# Patient Record
Sex: MALE | Race: WHITE | NOT HISPANIC OR LATINO | Employment: OTHER | URBAN - NONMETROPOLITAN AREA
[De-identification: names, ages, dates, MRNs, and addresses within clinical notes are randomized per-mention and may not be internally consistent; named-entity substitution may affect disease eponyms.]

---

## 2023-11-03 ENCOUNTER — DOCUMENTATION (OUTPATIENT)
Dept: FAMILY MEDICINE CLINIC | Facility: CLINIC | Age: 63
End: 2023-11-03
Payer: MEDICARE

## 2023-11-03 PROBLEM — J96.01 ACUTE HYPOXIC RESPIRATORY FAILURE: Status: ACTIVE | Noted: 2023-11-03

## 2023-11-03 PROBLEM — F10.20 ALCOHOL DEPENDENCE: Status: ACTIVE | Noted: 2023-11-03
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
## 2023-11-03 PROBLEM — S22.32XD FRACTURE OF ONE RIB, LEFT SIDE, SUBSEQUENT ENCOUNTER FOR FRACTURE WITH ROUTINE HEALING: Status: ACTIVE | Noted: 2023-11-03

## 2023-11-03 PROBLEM — M62.81 MUSCLE WEAKNESS (GENERALIZED): Status: ACTIVE | Noted: 2023-11-03

## 2023-11-03 PROBLEM — F10.931 ALCOHOL WITHDRAWAL DELIRIUM, ACUTE, HYPERACTIVE: Status: ACTIVE | Noted: 2023-11-03

## 2023-11-03 RX ORDER — OXYCODONE HYDROCHLORIDE 5 MG/1
5 TABLET ORAL EVERY 6 HOURS PRN
COMMUNITY
End: 2023-11-07 | Stop reason: SDUPTHER

## 2023-11-03 RX ORDER — POLYETHYLENE GLYCOL 3350 17 G/17G
17 POWDER, FOR SOLUTION ORAL DAILY
COMMUNITY

## 2023-11-03 RX ORDER — ACETAMINOPHEN 325 MG/1
650 TABLET ORAL EVERY 8 HOURS PRN
COMMUNITY

## 2023-11-03 RX ORDER — INSULIN GLARGINE 100 [IU]/ML
25 INJECTION, SOLUTION SUBCUTANEOUS DAILY
COMMUNITY

## 2023-11-03 RX ORDER — ASCORBIC ACID 500 MG
500 TABLET ORAL DAILY
COMMUNITY

## 2023-11-03 RX ORDER — ORAL SEMAGLUTIDE 3 MG/1
3 TABLET ORAL DAILY
COMMUNITY

## 2023-11-03 RX ORDER — TAMSULOSIN HYDROCHLORIDE 0.4 MG/1
1 CAPSULE ORAL DAILY
COMMUNITY

## 2023-11-03 RX ORDER — CHOLECALCIFEROL (VITAMIN D3) 125 MCG
5 CAPSULE ORAL NIGHTLY PRN
COMMUNITY

## 2023-11-06 PROBLEM — M54.30 SCIATICA: Status: ACTIVE | Noted: 2023-11-06

## 2023-11-06 PROBLEM — R06.02 SOB (SHORTNESS OF BREATH): Status: ACTIVE | Noted: 2023-11-06

## 2023-11-06 RX ORDER — NICOTINE 21 MG/24HR
1 PATCH, TRANSDERMAL 24 HOURS TRANSDERMAL EVERY 24 HOURS
COMMUNITY

## 2023-11-06 RX ORDER — HYDROCHLOROTHIAZIDE 12.5 MG/1
12.5 TABLET ORAL DAILY
COMMUNITY

## 2023-11-06 RX ORDER — LISINOPRIL 40 MG/1
40 TABLET ORAL DAILY
COMMUNITY

## 2023-11-06 RX ORDER — HYDROXYZINE HYDROCHLORIDE 25 MG/1
12.5 TABLET, FILM COATED ORAL NIGHTLY PRN
COMMUNITY

## 2023-11-06 RX ORDER — MULTIPLE VITAMINS W/ MINERALS TAB 9MG-400MCG
1 TAB ORAL DAILY
COMMUNITY

## 2023-11-06 RX ORDER — PANTOPRAZOLE SODIUM 40 MG/1
40 TABLET, DELAYED RELEASE ORAL DAILY
COMMUNITY

## 2023-11-06 RX ORDER — METHOCARBAMOL 500 MG/1
500 TABLET, FILM COATED ORAL 3 TIMES DAILY PRN
COMMUNITY

## 2023-11-06 RX ORDER — BACLOFEN 10 MG/1
10 TABLET ORAL 3 TIMES DAILY PRN
COMMUNITY

## 2023-11-06 RX ORDER — AMLODIPINE BESYLATE 5 MG/1
5 TABLET ORAL DAILY
COMMUNITY

## 2023-11-06 RX ORDER — FINASTERIDE 5 MG/1
5 TABLET, FILM COATED ORAL DAILY
COMMUNITY

## 2023-11-07 RX ORDER — OXYCODONE HYDROCHLORIDE 5 MG/1
5 TABLET ORAL EVERY 6 HOURS PRN
Qty: 120 TABLET | Refills: 0 | Status: SHIPPED | OUTPATIENT
Start: 2023-11-07

## 2023-11-07 NOTE — TELEPHONE ENCOUNTER
[facility] REQUESTING MED REFILL FOR OXYCODONE 5 MG.    DIRECTIONS: OXYCODONE 5 MG 1 TAB PO  Q 6 HRS PRN.

## 2023-11-08 NOTE — PROGRESS NOTES
Nursing Home History and Physical       Edison Torres DO  [address] [place] [address] Phone: [phone]  Fax: [phone]     PATIENT NAME: Minh Wright                                                                          YOB: 1960           DATE OF SERVICE: 11/09/2023  FACILITY:  [facility]    CHIEF COMPLAINT:  Nursing facility admission      HISTORY OF PRESENT ILLNESS:   Patient is a 63-year-old male with a history of osteogenesis imperfecta, type 2 diabetes mellitus, obstructive sleep apnea, and alcohol use disorder recently hospitalized at  for fall resulting in left rib fracture.  Patient was also treated for alcohol withdrawal but was relatively mild.  Due to debility and weakness, patient was transferred to this facility for strengthening and closer monitoring.    On exam today patient was sitting up comfortably working on his laptop at the side of his bed with the TV on.  He shares that he has been doing quite well since his admission to this facility and feels that he can do more than what physical therapy is expecting him to do.  He is looking forward to discharging to home on Saturday.  Rib pain is under reasonable control.  He is very aware of his history of osteogenesis imperfecta and multiple fractures.  Remains well off of alcohol.  Glucose has been better controlled in the facility.      PAST MEDICAL & SURGICAL HISTORY:   Past Medical History:   Diagnosis Date    Alcohol use disorder     Allergic     Allergic rhinitis     Anxiety     Arthritis     Arthropathy     At high risk for falls     Benign prostatic hyperplasia with lower urinary tract symptoms     BMI 40.0-44.9, adult     Cardiomegaly     Cataract     Cellulitis of unspecified part of limb     CPAP (continuous positive airway pressure) dependence     Elevation of levels of liver transaminase levels     GERD (gastroesophageal reflux disease)     GERD without esophagitis     Hyperlipidemia      "Hypertension     Low back pain     Lymphedema, both  lower extremities     Multiple lung nodules     Obesity     Obstructive sleep apnea (adult) (pediatric)     Osteogenesis imperfecta     Other fatigue     Polyneuropathy     Posterior vitreous detachment, right eye     Retention of urine     Right knee pain     Shoulder pain     Transaminitis     Type 2 diabetes mellitus with diabetic neuropathy, unspecified     Type 2 diabetes mellitus without complications     Vitreous floaters       Past Surgical History:   Procedure Laterality Date    APPENDECTOMY      CATARACT EXTRACTION W/ INTRAOCULAR LENS IMPLANT      FRACTURE SURGERY      LEG SURGERY           MEDICATIONS:  I have reviewed and reconciled the patients medication list in the patients chart at the [facility] on 11/09/2023.      ALLERGIES:  Allergies   Allergen Reactions    Duloxetine Unknown - High Severity    Hydrocodone Bitartrate [Hydrocodone] Unknown - High Severity    Morphine Unknown - High Severity    Oxycodone Unknown - High Severity     ITCHING         SOCIAL HISTORY:  Social History     Socioeconomic History    Marital status: Single   Tobacco Use    Smoking status: Every Day     Types: Cigarettes   Substance and Sexual Activity    Alcohol use: Yes     Alcohol/week: 2.0 - 3.0 standard drinks of alcohol     Types: 2 - 3 Cans of beer per week     Comment: \"A BOTTLE OF DEBI DAILY,ALONG WITH SEVERAL BEERS\"    Drug use: Never    Sexual activity: Defer       FAMILY HISTORY:  Family History   Problem Relation Age of Onset    Cancer Mother     Asthma Mother     Colon cancer Mother     Heart disease Father     Osteogenesis imperfecta Father     Bipolar disorder Brother     Heart attack Paternal Uncle     Suicidality Maternal Grandfather         REVIEW OF SYSTEMS:  Review of Systems   Constitutional:  Negative for chills, fatigue and fever.   HENT:  Negative for congestion, ear pain, rhinorrhea, sinus pressure and sore throat.    Eyes:  " Negative for visual disturbance.   Respiratory:  Negative for cough, chest tightness, shortness of breath and wheezing.    Cardiovascular:  Negative for chest pain, palpitations and leg swelling.   Gastrointestinal:  Negative for abdominal pain, blood in stool, constipation, diarrhea, nausea and vomiting.   Endocrine: Negative for polydipsia and polyuria.   Genitourinary:  Negative for dysuria and hematuria.   Musculoskeletal:  Negative for arthralgias and back pain.   Skin:  Negative for rash.   Neurological:  Negative for dizziness, light-headedness, numbness and headaches.   Psychiatric/Behavioral:  Negative for dysphoric mood and sleep disturbance. The patient is not nervous/anxious.          PHYSICAL EXAMINATION:   VITAL SIGNS: /78   Pulse 72   Temp 97.9 °F (36.6 °C)   Resp 18   SpO2 97%     Physical Exam  Vitals and nursing note reviewed.   Constitutional:       Appearance: Normal appearance. He is well-developed. He is obese.   HENT:      Head: Normocephalic and atraumatic.      Nose: Nose normal.      Mouth/Throat:      Mouth: Mucous membranes are moist.      Pharynx: No oropharyngeal exudate.   Eyes:      General: No scleral icterus.     Conjunctiva/sclera: Conjunctivae normal.      Pupils: Pupils are equal, round, and reactive to light.      Comments: Blue conjunctive a   Neck:      Thyroid: No thyromegaly.   Cardiovascular:      Rate and Rhythm: Normal rate and regular rhythm.      Heart sounds: Normal heart sounds. No murmur heard.     No friction rub. No gallop.   Pulmonary:      Effort: Pulmonary effort is normal. No respiratory distress.      Breath sounds: Normal breath sounds. No wheezing.   Abdominal:      General: Bowel sounds are normal. There is no distension.      Palpations: Abdomen is soft.      Tenderness: There is no abdominal tenderness.   Musculoskeletal:         General: No deformity or signs of injury.      Cervical back: Normal range of motion and neck supple.    Lymphadenopathy:      Cervical: No cervical adenopathy.   Skin:     General: Skin is warm and dry.      Findings: No rash.   Neurological:      Mental Status: He is alert and oriented to person, place, and time.   Psychiatric:         Mood and Affect: Mood normal.         Behavior: Behavior normal.         RECORDS REVIEW:   Discharge Summary from [facility] 11/3/2023    ASSESSMENT   Diagnoses and all orders for this visit:    1. Fracture of one rib, left side, subsequent encounter for fracture with routine healing (Primary)    2. Alcohol dependence in remission    3. Muscle weakness (generalized)    4. Sciatica of right side    5. Benign prostatic hyperplasia with urinary frequency    6. Mixed hyperlipidemia    7. Type 2 diabetes mellitus without complication, without long-term current use of insulin    8. Morbid (severe) obesity due to excess calories        PLAN  Acute seventh rib fracture  - Continue pain control with acetaminophen, methocarbamol, and oxycodone.    Osteogenesis imperfecta  - Patient is at high risk for fractures with falls.  - Continue close monitoring with fall prevention protocols.    Alcoholism  - Off alcohol since admission to facility    Alcoholic liver disease/transaminitis  - Work-up at  presented hepatic steatosis and LFTs were trending downwards.  - Monitor liver function in facility intermittently.    Essential hypertension  - Stable on home regimen of amlodipine, HCTZ, and lisinopril.  - It is noted that at home his compliance with medications was questionable.    BPH/overactive bladder  - Continue finasteride and tamsulosin.    Hyperlipidemia  - Atorvastatin was restarted at the time of discharge from hospital.    Type 2 diabetes mellitus  - In hospital: Lantus 25 units nightly with 3 units with meals plus SSI  -In nursing facility we will continue home regimen of metformin and semaglutide  - Last A1c reported at 8.5.  ROSALIE  - Continue CPAP.    Morbid obesity  - BMI of 45.  This  does complicate aspects of care and recovery time.            [x]  Discussed Patient in detail with nursing/staff, addressed all needs today.     [x]  Plan of Care Reviewed   [x]  PT/OT Reviewed   []  Order Changes  []  Discharge Plans Reviewed  [x]  Advance Directive on file with Nursing Home.   [x]  POA on file with Nursing Home.    [x]  Code Status listed and reviewed.         Edison Torres DO.  11/9/2023      **Part of this note may be an electronic transcription/translation of spoken language to printed text using the Dragon Dictation System.**

## 2023-11-09 ENCOUNTER — NURSING HOME (OUTPATIENT)
Dept: INTERNAL MEDICINE | Facility: CLINIC | Age: 63
End: 2023-11-09
Payer: MEDICARE

## 2023-11-09 VITALS
RESPIRATION RATE: 18 BRPM | HEART RATE: 72 BPM | TEMPERATURE: 97.9 F | OXYGEN SATURATION: 97 % | SYSTOLIC BLOOD PRESSURE: 142 MMHG | DIASTOLIC BLOOD PRESSURE: 78 MMHG

## 2023-11-09 DIAGNOSIS — R35.0 BENIGN PROSTATIC HYPERPLASIA WITH URINARY FREQUENCY: ICD-10-CM

## 2023-11-09 DIAGNOSIS — E78.2 MIXED HYPERLIPIDEMIA: ICD-10-CM

## 2023-11-09 DIAGNOSIS — S22.32XD FRACTURE OF ONE RIB, LEFT SIDE, SUBSEQUENT ENCOUNTER FOR FRACTURE WITH ROUTINE HEALING: Primary | ICD-10-CM

## 2023-11-09 DIAGNOSIS — M54.31 SCIATICA OF RIGHT SIDE: ICD-10-CM

## 2023-11-09 DIAGNOSIS — F10.21 ALCOHOL DEPENDENCE IN REMISSION: ICD-10-CM

## 2023-11-09 DIAGNOSIS — M62.81 MUSCLE WEAKNESS (GENERALIZED): ICD-10-CM

## 2023-11-09 DIAGNOSIS — E66.01 MORBID (SEVERE) OBESITY DUE TO EXCESS CALORIES: ICD-10-CM

## 2023-11-09 DIAGNOSIS — E11.9 TYPE 2 DIABETES MELLITUS WITHOUT COMPLICATION, WITHOUT LONG-TERM CURRENT USE OF INSULIN: ICD-10-CM

## 2023-11-09 DIAGNOSIS — N40.1 BENIGN PROSTATIC HYPERPLASIA WITH URINARY FREQUENCY: ICD-10-CM

## 2023-11-09 NOTE — LETTER
Nursing Home History and Physical       Edison Torres DO  [address] [place] [address] Phone: [phone]  Fax: [phone]     PATIENT NAME: Minh Wright                                                                          YOB: 1960           DATE OF SERVICE: 11/09/2023  FACILITY:  [facility]    CHIEF COMPLAINT:  Nursing facility admission      HISTORY OF PRESENT ILLNESS:   Patient is a 63-year-old male with a history of osteogenesis imperfecta, type 2 diabetes mellitus, obstructive sleep apnea, and alcohol use disorder recently hospitalized at  for fall resulting in left rib fracture.  Patient was also treated for alcohol withdrawal but was relatively mild.  Due to debility and weakness, patient was transferred to this facility for strengthening and closer monitoring.    On exam today patient was sitting up comfortably working on his laptop at the side of his bed with the TV on.  He shares that he has been doing quite well since his admission to this facility and feels that he can do more than what physical therapy is expecting him to do.  He is looking forward to discharging to home on Saturday.  Rib pain is under reasonable control.  He is very aware of his history of osteogenesis imperfecta and multiple fractures.  Remains well off of alcohol.  Glucose has been better controlled in the facility.      PAST MEDICAL & SURGICAL HISTORY:   Past Medical History:   Diagnosis Date    Alcohol use disorder     Allergic     Allergic rhinitis     Anxiety     Arthritis     Arthropathy     At high risk for falls     Benign prostatic hyperplasia with lower urinary tract symptoms     BMI 40.0-44.9, adult     Cardiomegaly     Cataract     Cellulitis of unspecified part of limb     CPAP (continuous positive airway pressure) dependence     Elevation of levels of liver transaminase levels     GERD (gastroesophageal reflux disease)     GERD without esophagitis     Hyperlipidemia      "Hypertension     Low back pain     Lymphedema, both  lower extremities     Multiple lung nodules     Obesity     Obstructive sleep apnea (adult) (pediatric)     Osteogenesis imperfecta     Other fatigue     Polyneuropathy     Posterior vitreous detachment, right eye     Retention of urine     Right knee pain     Shoulder pain     Transaminitis     Type 2 diabetes mellitus with diabetic neuropathy, unspecified     Type 2 diabetes mellitus without complications     Vitreous floaters       Past Surgical History:   Procedure Laterality Date    APPENDECTOMY      CATARACT EXTRACTION W/ INTRAOCULAR LENS IMPLANT      FRACTURE SURGERY      LEG SURGERY           MEDICATIONS:  I have reviewed and reconciled the patients medication list in the patients chart at the [facility] on 11/09/2023.      ALLERGIES:  Allergies   Allergen Reactions    Duloxetine Unknown - High Severity    Hydrocodone Bitartrate [Hydrocodone] Unknown - High Severity    Morphine Unknown - High Severity    Oxycodone Unknown - High Severity     ITCHING         SOCIAL HISTORY:  Social History     Socioeconomic History    Marital status: Single   Tobacco Use    Smoking status: Every Day     Types: Cigarettes   Substance and Sexual Activity    Alcohol use: Yes     Alcohol/week: 2.0 - 3.0 standard drinks of alcohol     Types: 2 - 3 Cans of beer per week     Comment: \"A BOTTLE OF DEBI DAILY,ALONG WITH SEVERAL BEERS\"    Drug use: Never    Sexual activity: Defer       FAMILY HISTORY:  Family History   Problem Relation Age of Onset    Cancer Mother     Asthma Mother     Colon cancer Mother     Heart disease Father     Osteogenesis imperfecta Father     Bipolar disorder Brother     Heart attack Paternal Uncle     Suicidality Maternal Grandfather         REVIEW OF SYSTEMS:  Review of Systems   Constitutional:  Negative for chills, fatigue and fever.   HENT:  Negative for congestion, ear pain, rhinorrhea, sinus pressure and sore throat.    Eyes:  " Negative for visual disturbance.   Respiratory:  Negative for cough, chest tightness, shortness of breath and wheezing.    Cardiovascular:  Negative for chest pain, palpitations and leg swelling.   Gastrointestinal:  Negative for abdominal pain, blood in stool, constipation, diarrhea, nausea and vomiting.   Endocrine: Negative for polydipsia and polyuria.   Genitourinary:  Negative for dysuria and hematuria.   Musculoskeletal:  Negative for arthralgias and back pain.   Skin:  Negative for rash.   Neurological:  Negative for dizziness, light-headedness, numbness and headaches.   Psychiatric/Behavioral:  Negative for dysphoric mood and sleep disturbance. The patient is not nervous/anxious.          PHYSICAL EXAMINATION:   VITAL SIGNS: /78   Pulse 72   Temp 97.9 °F (36.6 °C)   Resp 18   SpO2 97%     Physical Exam  Vitals and nursing note reviewed.   Constitutional:       Appearance: Normal appearance. He is well-developed. He is obese.   HENT:      Head: Normocephalic and atraumatic.      Nose: Nose normal.      Mouth/Throat:      Mouth: Mucous membranes are moist.      Pharynx: No oropharyngeal exudate.   Eyes:      General: No scleral icterus.     Conjunctiva/sclera: Conjunctivae normal.      Pupils: Pupils are equal, round, and reactive to light.      Comments: Blue conjunctive a   Neck:      Thyroid: No thyromegaly.   Cardiovascular:      Rate and Rhythm: Normal rate and regular rhythm.      Heart sounds: Normal heart sounds. No murmur heard.     No friction rub. No gallop.   Pulmonary:      Effort: Pulmonary effort is normal. No respiratory distress.      Breath sounds: Normal breath sounds. No wheezing.   Abdominal:      General: Bowel sounds are normal. There is no distension.      Palpations: Abdomen is soft.      Tenderness: There is no abdominal tenderness.   Musculoskeletal:         General: No deformity or signs of injury.      Cervical back: Normal range of motion and neck supple.    Lymphadenopathy:      Cervical: No cervical adenopathy.   Skin:     General: Skin is warm and dry.      Findings: No rash.   Neurological:      Mental Status: He is alert and oriented to person, place, and time.   Psychiatric:         Mood and Affect: Mood normal.         Behavior: Behavior normal.         RECORDS REVIEW:   Discharge Summary from [facility] 11/3/2023    ASSESSMENT   Diagnoses and all orders for this visit:    1. Fracture of one rib, left side, subsequent encounter for fracture with routine healing (Primary)    2. Alcohol dependence in remission    3. Muscle weakness (generalized)    4. Sciatica of right side    5. Benign prostatic hyperplasia with urinary frequency    6. Mixed hyperlipidemia    7. Type 2 diabetes mellitus without complication, without long-term current use of insulin    8. Morbid (severe) obesity due to excess calories        PLAN  Acute seventh rib fracture  - Continue pain control with acetaminophen, methocarbamol, and oxycodone.    Osteogenesis imperfecta  - Patient is at high risk for fractures with falls.  - Continue close monitoring with fall prevention protocols.    Alcoholism  - Off alcohol since admission to facility    Alcoholic liver disease/transaminitis  - Work-up at  presented hepatic steatosis and LFTs were trending downwards.  - Monitor liver function in facility intermittently.    Essential hypertension  - Stable on home regimen of amlodipine, HCTZ, and lisinopril.  - It is noted that at home his compliance with medications was questionable.    BPH/overactive bladder  - Continue finasteride and tamsulosin.    Hyperlipidemia  - Atorvastatin was restarted at the time of discharge from hospital.    Type 2 diabetes mellitus  - In hospital: Lantus 25 units nightly with 3 units with meals plus SSI  -In nursing facility we will continue home regimen of metformin and semaglutide  - Last A1c reported at 8.5.  ROSALIE  - Continue CPAP.    Morbid obesity  - BMI of 45.  This  does complicate aspects of care and recovery time.            [x]  Discussed Patient in detail with nursing/staff, addressed all needs today.     [x]  Plan of Care Reviewed   [x]  PT/OT Reviewed   []  Order Changes  []  Discharge Plans Reviewed  [x]  Advance Directive on file with Nursing Home.   [x]  POA on file with Nursing Home.    [x]  Code Status listed and reviewed.         Edison Torres DO.  11/9/2023      **Part of this note may be an electronic transcription/translation of spoken language to printed text using the Dragon Dictation System.**